# Patient Record
Sex: FEMALE | Race: WHITE | NOT HISPANIC OR LATINO | RURAL
[De-identification: names, ages, dates, MRNs, and addresses within clinical notes are randomized per-mention and may not be internally consistent; named-entity substitution may affect disease eponyms.]

---
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
## 2021-05-18 NOTE — PATIENT DISCUSSION
New Prescription: erythromycin (erythromycin): ointment: 5 mg/gram (0.5 %) a small amount twice a day as directed on eyelid 05-

## 2022-07-28 ENCOUNTER — COMPREHENSIVE EXAM (OUTPATIENT)
Dept: RURAL CLINIC 1 | Facility: CLINIC | Age: 22
End: 2022-07-28

## 2022-07-28 DIAGNOSIS — H52.13: ICD-10-CM

## 2022-07-28 PROCEDURE — S0621 ROUTINE OPHTHALMOLOGICAL EXA: HCPCS

## 2022-07-28 ASSESSMENT — VISUAL ACUITY
OS_CC: 20/25
OD_CC: 20/25
OU_CC: 20/20

## 2022-07-28 ASSESSMENT — TONOMETRY
OS_IOP_MMHG: 17
OD_IOP_MMHG: 17

## 2023-07-12 ENCOUNTER — CONSULTATION/EVALUATION (OUTPATIENT)
Dept: RURAL CLINIC 1 | Facility: CLINIC | Age: 23
End: 2023-07-12

## 2023-07-12 DIAGNOSIS — H52.13: ICD-10-CM

## 2023-07-12 PROCEDURE — 99499LK REFRACTIVE CONSULT/LASIK

## 2023-07-12 PROCEDURE — 92132 CPTRZD OPH DX IMG ANT SGM: CPT

## 2023-07-12 ASSESSMENT — VISUAL ACUITY
OD_CC: 20/25
OS_SC: CF 4FT
OD_SC: CF 4FT
OS_CC: 20/20

## 2023-07-12 ASSESSMENT — TONOMETRY
OS_IOP_MMHG: 16
OD_IOP_MMHG: 17

## 2023-09-06 ENCOUNTER — CLINIC PROCEDURE ONLY (OUTPATIENT)
Dept: URBAN - NONMETROPOLITAN AREA CLINIC 1 | Facility: CLINIC | Age: 23
End: 2023-09-06

## 2023-09-06 DIAGNOSIS — H52.13: ICD-10-CM

## 2023-09-06 PROCEDURE — S0800 LASER IN SITU KERATOMILEUSIS: HCPCS

## 2023-09-07 ENCOUNTER — POST-OP (OUTPATIENT)
Dept: RURAL CLINIC 1 | Facility: CLINIC | Age: 23
End: 2023-09-07

## 2023-09-07 DIAGNOSIS — Z98.890: ICD-10-CM

## 2023-09-07 PROCEDURE — 99024 POSTOP FOLLOW-UP VISIT: CPT

## 2023-09-07 ASSESSMENT — VISUAL ACUITY
OS_SC: 20/20
OU_SC: 20/25
OS_SC: 20/40
OU_SC: 20/20
OD_SC: 20/30-1
OD_SC: 20/20

## 2023-09-14 ENCOUNTER — POST-OP (OUTPATIENT)
Dept: RURAL CLINIC 1 | Facility: CLINIC | Age: 23
End: 2023-09-14

## 2023-09-14 DIAGNOSIS — Z98.890: ICD-10-CM

## 2023-09-14 PROCEDURE — 99024 POSTOP FOLLOW-UP VISIT: CPT

## 2023-09-14 ASSESSMENT — VISUAL ACUITY
OD_SC: 20/25-2
OS_SC: 20/25-2
OU_SC: 20/20

## 2023-10-12 ENCOUNTER — POST-OP (OUTPATIENT)
Dept: RURAL CLINIC 1 | Facility: CLINIC | Age: 23
End: 2023-10-12

## 2023-10-12 DIAGNOSIS — Z98.890: ICD-10-CM

## 2023-10-12 PROCEDURE — 99024 POSTOP FOLLOW-UP VISIT: CPT

## 2023-10-12 ASSESSMENT — VISUAL ACUITY
OD_SC: 20/20
OS_SC: 20/20

## 2024-04-17 ENCOUNTER — POST-OP (OUTPATIENT)
Dept: RURAL CLINIC 1 | Facility: CLINIC | Age: 24
End: 2024-04-17

## 2024-04-17 DIAGNOSIS — Z98.890: ICD-10-CM

## 2024-04-17 PROCEDURE — 99024 POSTOP FOLLOW-UP VISIT: CPT

## 2024-04-17 ASSESSMENT — VISUAL ACUITY
OD_SC: 20/20
OS_SC: 20/20
OU_SC: 20/20

## 2024-04-17 ASSESSMENT — TONOMETRY
OS_IOP_MMHG: 14
OD_IOP_MMHG: 14

## 2025-04-21 ENCOUNTER — COMPREHENSIVE EXAM (OUTPATIENT)
Age: 25
End: 2025-04-21

## 2025-04-21 DIAGNOSIS — H52.03: ICD-10-CM

## 2025-04-21 DIAGNOSIS — Z98.890: ICD-10-CM

## 2025-04-21 PROCEDURE — S0621AEC ROUTINE OPH EXAM INCLUDES REF/ EST PATIENT
